# Patient Record
Sex: MALE | Race: BLACK OR AFRICAN AMERICAN | Employment: FULL TIME | URBAN - METROPOLITAN AREA
[De-identification: names, ages, dates, MRNs, and addresses within clinical notes are randomized per-mention and may not be internally consistent; named-entity substitution may affect disease eponyms.]

---

## 2018-12-15 ENCOUNTER — HOSPITAL ENCOUNTER (EMERGENCY)
Age: 33
Discharge: HOME | End: 2018-12-15
Payer: COMMERCIAL

## 2018-12-15 VITALS
HEIGHT: 63 IN | DIASTOLIC BLOOD PRESSURE: 74 MMHG | SYSTOLIC BLOOD PRESSURE: 115 MMHG | HEART RATE: 100 BPM | OXYGEN SATURATION: 96 % | TEMPERATURE: 100.3 F | WEIGHT: 218.48 LBS | RESPIRATION RATE: 14 BRPM | BODY MASS INDEX: 38.71 KG/M2

## 2018-12-15 DIAGNOSIS — J02.9 ACUTE PHARYNGITIS, UNSPECIFIED ETIOLOGY: ICD-10-CM

## 2018-12-15 DIAGNOSIS — H66.92 LEFT ACUTE OTITIS MEDIA: Primary | ICD-10-CM

## 2018-12-15 LAB — S PYO AG THROAT QL: NEGATIVE

## 2018-12-15 PROCEDURE — 6370000000 HC RX 637 (ALT 250 FOR IP): Performed by: NURSE PRACTITIONER

## 2018-12-15 PROCEDURE — 99283 EMERGENCY DEPT VISIT LOW MDM: CPT

## 2018-12-15 PROCEDURE — 87081 CULTURE SCREEN ONLY: CPT

## 2018-12-15 PROCEDURE — 87880 STREP A ASSAY W/OPTIC: CPT

## 2018-12-15 RX ORDER — AMOXICILLIN AND CLAVULANATE POTASSIUM 875; 125 MG/1; MG/1
1 TABLET, FILM COATED ORAL 2 TIMES DAILY
Qty: 20 TABLET | Refills: 0 | Status: SHIPPED | OUTPATIENT
Start: 2018-12-15 | End: 2018-12-25

## 2018-12-15 RX ORDER — AMOXICILLIN AND CLAVULANATE POTASSIUM 875; 125 MG/1; MG/1
1 TABLET, FILM COATED ORAL ONCE
Status: COMPLETED | OUTPATIENT
Start: 2018-12-15 | End: 2018-12-15

## 2018-12-15 RX ADMIN — AMOXICILLIN AND CLAVULANATE POTASSIUM 1 TABLET: 875; 125 TABLET, FILM COATED ORAL at 20:25

## 2018-12-15 ASSESSMENT — ENCOUNTER SYMPTOMS
SHORTNESS OF BREATH: 0
SORE THROAT: 1
COUGH: 0
FACIAL SWELLING: 0
VOMITING: 0
ABDOMINAL PAIN: 0
NAUSEA: 0

## 2018-12-16 NOTE — ED PROVIDER NOTES
file.      SURGICAL HISTORY   No past surgical history on file. CURRENTMEDICATIONS       Previous Medications    No medications on file         ALLERGIES     Patient has no allergy information on record. FAMILYHISTORY     No family history on file. SOCIAL HISTORY       Social History     Social History    Marital status: Single     Spouse name: N/A    Number of children: N/A    Years of education: N/A     Social History Main Topics    Smoking status: Not on file    Smokeless tobacco: Not on file    Alcohol use Not on file    Drug use: Unknown    Sexual activity: Not on file     Other Topics Concern    Not on file     Social History Narrative    No narrative on file       SCREENINGS             PHYSICAL EXAM    (up to 7 for level 4, 8 or more for level 5)     ED Triage Vitals [12/15/18 2008]   BP Temp Temp Source Pulse Resp SpO2 Height Weight   115/74 100.3 °F (37.9 °C) Oral 100 14 96 % 5' 3\" (1.6 m) 218 lb 7.6 oz (99.1 kg)       Physical Exam   Constitutional: He is oriented to person, place, and time. He appears well-developed and well-nourished. HENT:   Head: Normocephalic and atraumatic. Nose: Nose normal.   bilat ear with cerumen, TM dull bilaterally without external ear pain, no pre-or post-auricular tenderness, post orophaynx erythema with tonsilar edema, no exudate   Eyes: Conjunctivae are normal.   Neck: Normal range of motion. Neck supple. Cardiovascular: Normal rate. Pulmonary/Chest: Effort normal and breath sounds normal. No respiratory distress. He has no wheezes. Abdominal: Soft. There is no tenderness. Musculoskeletal: Normal range of motion. Lymphadenopathy:     He has no cervical adenopathy. Neurological: He is alert and oriented to person, place, and time. Skin: Skin is warm and dry. Capillary refill takes less than 2 seconds. No rash noted. No erythema. Psychiatric: He has a normal mood and affect.  His behavior is normal.   Nursing note and vitals

## 2018-12-17 LAB
ORGANISM: ABNORMAL
S PYO THROAT QL CULT: ABNORMAL
S PYO THROAT QL CULT: ABNORMAL

## 2021-09-22 ENCOUNTER — HOSPITAL ENCOUNTER (EMERGENCY)
Age: 36
Discharge: HOME | End: 2021-09-22
Payer: OTHER GOVERNMENT

## 2021-09-22 ENCOUNTER — APPOINTMENT (OUTPATIENT)
Dept: GENERAL RADIOLOGY | Age: 36
End: 2021-09-22
Payer: OTHER GOVERNMENT

## 2021-09-22 VITALS
DIASTOLIC BLOOD PRESSURE: 80 MMHG | RESPIRATION RATE: 18 BRPM | OXYGEN SATURATION: 95 % | WEIGHT: 206.79 LBS | BODY MASS INDEX: 36.64 KG/M2 | HEART RATE: 95 BPM | HEIGHT: 63 IN | SYSTOLIC BLOOD PRESSURE: 126 MMHG | TEMPERATURE: 96.9 F

## 2021-09-22 DIAGNOSIS — Z20.822 SUSPECTED COVID-19 VIRUS INFECTION: Primary | ICD-10-CM

## 2021-09-22 PROCEDURE — U0003 INFECTIOUS AGENT DETECTION BY NUCLEIC ACID (DNA OR RNA); SEVERE ACUTE RESPIRATORY SYNDROME CORONAVIRUS 2 (SARS-COV-2) (CORONAVIRUS DISEASE [COVID-19]), AMPLIFIED PROBE TECHNIQUE, MAKING USE OF HIGH THROUGHPUT TECHNOLOGIES AS DESCRIBED BY CMS-2020-01-R: HCPCS

## 2021-09-22 PROCEDURE — U0005 INFEC AGEN DETEC AMPLI PROBE: HCPCS

## 2021-09-22 PROCEDURE — 71045 X-RAY EXAM CHEST 1 VIEW: CPT

## 2021-09-22 PROCEDURE — 99283 EMERGENCY DEPT VISIT LOW MDM: CPT

## 2021-09-22 RX ORDER — BENZONATATE 100 MG/1
100 CAPSULE ORAL 3 TIMES DAILY PRN
Qty: 30 CAPSULE | Refills: 0 | Status: SHIPPED | OUTPATIENT
Start: 2021-09-22 | End: 2021-09-29

## 2021-09-22 ASSESSMENT — ENCOUNTER SYMPTOMS
SHORTNESS OF BREATH: 0
RHINORRHEA: 0
NAUSEA: 0
VOMITING: 0
ABDOMINAL PAIN: 0
EYES NEGATIVE: 1
COUGH: 1

## 2021-09-22 NOTE — ED TRIAGE NOTES
Pt arrives for eval of headache, cough and body aches onset Monday. PT sts mother and son have Covid. Pt is a/xo4, resp nonlabored and pwd.

## 2021-09-22 NOTE — ED PROVIDER NOTES
[address] [facility]  [address] [address]  Dept: [address] [facility] [address]: [phone]  eMERGENCYdEPARTMENT eNCOUnter      Pt Name: Wendi Barnett  MRN: [id number]  [place] 1985  Date of evaluation: 9/22/2021  Provider:Cecy Sterling PA-C    CHIEF COMPLAINT       Chief Complaint   Patient presents with    Cough     onset 9/20/2021. cough, body aches, fatigue. poor appetite       CRITICAL CARE TIME   Total Critical Care time was 0 minutes, excluding separately reportable procedures. There was a high probability of clinically significant/life threatening deterioration in the patient's condition which required my urgentintervention. HISTORY OF PRESENT ILLNESS  (Location/Symptom, Timing/Onset, Context/Setting, Quality, Duration,Modifying Factors, Severity.)   Wendi Barnett is a 29 y.o. male who presents to the emergency department by private vehicle with concerns for COVID-19. He has had a nonproductive cough, body aches, fatigue and poor appetite since Monday. Today he noticed loss of sense of taste and smell. He has chest pain with coughing only. Denies chest pain in absence of cough, no shortness of breath. No other medical problems. Fiance sick with similar symptoms. Has not received COVID-19 vaccine. Nursing Notes were reviewedand agreed with or any disagreements were addressed in the HPI. REVIEW OF SYSTEMS    (2-9 systems for level 4, 10 or more for level 5)     Review of Systems   Constitutional: Positive for fatigue. HENT: Negative for congestion and rhinorrhea. Loss of sense of taste and smell   Eyes: Negative. Respiratory: Positive for cough. Negative for shortness of breath. Cardiovascular: Positive for chest pain (with couging only). Gastrointestinal: Negative for abdominal pain, nausea and vomiting. Genitourinary: Negative. Musculoskeletal: Positive for arthralgias and myalgias. Skin: Negative. Neurological: Negative for headaches. Psychiatric/Behavioral: Negative for behavioral problems and confusion. Except as noted above the remainder of the review of systems was reviewed and negative. PAST MEDICAL HISTORY   No past medical history on file. SURGICAL HISTORY     No past surgical history on file. CURRENT MEDICATIONS     [unfilled]    ALLERGIES     Patient has no known allergies. FAMILY HISTORY     No family history on file. No family status information on file. SOCIAL HISTORY      reports that he has never smoked. He has never used smokeless tobacco. He reports previous alcohol use. He reports previous drug use. PHYSICAL EXAM    (up to 7 for level 4, 8 or more for level 5)     ED Triage Vitals   Enc Vitals Group      BP 09/22/21 1531 126/80      Pulse 09/22/21 1531 95      Resp 09/22/21 1531 18      Temp 09/22/21 1531 96.9 °F (36.1 °C)      Temp Source 09/22/21 1531 Tympanic      SpO2 09/22/21 1531 95 %      Weight 09/22/21 1515 206 lb 12.7 oz (93.8 kg)      Height 09/22/21 1515 5' 3\" (1.6 m)      Head Circumference --       Peak Flow --       Pain Score --       Pain Loc --       Pain Edu? --       Excl. in [address]? --         Physical Exam  Vitals reviewed. Constitutional:       Appearance: Normal appearance. HENT:      Head: Normocephalic and atraumatic. Cardiovascular:      Rate and Rhythm: Normal rate and regular rhythm. Pulmonary:      Effort: Pulmonary effort is normal. No respiratory distress. Breath sounds: Normal breath sounds. No stridor. No wheezing, rhonchi or rales. Musculoskeletal:         General: Normal range of motion. Cervical back: Normal range of motion and neck supple. Skin:     General: Skin is warm. Neurological:      General: No focal deficit present. Mental Status: He is alert and oriented to person, place, and time.    Psychiatric:         Mood and Affect: Mood normal.         Behavior: Behavior normal.           DIAGNOSTIC RESULTS     EKG: All EKG's are interpreted by the Emergency Department Physician who either signs or Co-signs this chart in the absence of a cardiologist.    RADIOLOGY:   Non-plain film images such as CT, Ultrasound and MRI are read by the radiologist. Plain radiographic images are visualized and preliminarilyinterpreted by the emergency physician with the below findings:    Interpretation per the Radiologist below,if available at the time of this note:    XR CHEST PORTABLE   Final Result   No acute cardiopulmonary abnormality. LABS:  Labs Reviewed   COVID-19       All other labs were within normal range or not returned as of this dictation. EMERGENCY DEPARTMENT COURSE and DIFFERENTIAL DIAGNOSIS/MDM:   Vitals:    Vitals:    09/22/21 1515 09/22/21 1531   BP:  126/80   Pulse:  95   Resp:  18   Temp:  96.9 °F (36.1 °C)   TempSrc:  Tympanic   SpO2:  95%   Weight: 206 lb 12.7 oz (93.8 kg)    Height: 5' 3\" (1.6 m)        MDM     Patient presents to the ED with the HPI noted above. Vital signs reviewed and normal.     Suspected COVID-19 or other viral illness. CXR normal. COVID-PCR obtained and pending. Patient will quarantine pending test results. He has chest pain with coughing only, no other chest pain, no shortness of breath. No further emergent workup and evaluation in ED indicated at this time. He was educated return precautions. He was given medications for symptomatic treatment. PCP referral information for care establishment provided at time of discharge. Patient discharged home in stable condition. The patient tolerated their visit well. I saw the patient independently with physician available for consultation as needed. I have discussed the findings of today's workup with the patient and addressed the patient's questions and concerns. Important warning signs as well as new or worsening symptoms which would necessitate immediate return to the ED were discussed.

## 2021-09-22 NOTE — LETTER
[facility] Emergency Department  [address] [facility] [address]  Phone: [phone]               September 22, 2021    Patient: Twila Rooney   YOB: 1985   Date of Visit: 9/22/2021       To Whom It May Concern:    Kvng Smith was seen and treated in our emergency department on 9/22/2021. He was tested for COVID, please allow him to quarantine for 2-3 days pending this test result. If positive he will need to quarantine for 10 days from symptom onset.       Sincerely,       Wesley Hendrickson PA-C         Signature:__________________________________

## 2021-09-23 ENCOUNTER — CARE COORDINATION (OUTPATIENT)
Dept: CARE COORDINATION | Age: 36
End: 2021-09-23

## 2021-09-23 LAB — SARS-COV-2: DETECTED

## 2021-09-24 ENCOUNTER — CARE COORDINATION (OUTPATIENT)
Dept: CARE COORDINATION | Age: 36
End: 2021-09-24

## 2021-09-24 NOTE — CARE COORDINATION
Patient contacted regarding COVID-19 diagnosis. Discussed COVID-19 related testing which was available at this time. Test results were positive. Patient informed of results, if available? Yes. Ambulatory Care Manager contacted the patient by telephone to perform post discharge assessment. Call within 2 business days of discharge: Yes. Verified name and  with patient as identifiers. Provided introduction to self, and explanation of the CTN/ACM role, and reason for call due to risk factors for infection and/or exposure to COVID-19. Symptoms reviewed with patient who verbalized the following symptoms: fatigue, pain or aching joints, cough, vomiting and diarrhea. Due to no new or worsening symptoms encounter was not routed to provider for escalation. Discussed follow-up appointments. If no appointment was previously scheduled, appointment scheduling offered: Yes. [facility] follow up appointment(s): No future appointments. Non-[facility] follow up appointment(s):     He will call pcp a [facility] , he was not sure of his drs name,encouraged to call to let them know of covid dx   Pt reports diarrhea/ vomiting have decreased, keeping food and water down    Non-face-to-face services provided:  Obtained and reviewed discharge summary and/or continuity of care documents  Reviewed and followed up on pending diagnostic tests and treatments-covid  Education of patient/family/caregiver/guardian to support self-management-cdc guidelines, my chart, sx mgt, quarantinig, return precautions, pcp follow up     Advance Care Planning:   Does patient have an Advance Directive:  not on file. Educated patient about risk for severe COVID-19 due to risk factors according to CDC guidelines. ACM reviewed discharge instructions, medical action plan and red flag symptoms with the patient who verbalized understanding. Discussed COVID vaccination status: Yes. Education provided on COVID-19 vaccination as appropriate.  Discussed exposure protocols and quarantine with CDC Guidelines. Patient was given an opportunity to verbalize any questions and concerns and agrees to contact ACM or health care provider for questions related to their healthcare. Reviewed and educated patient on any new and changed medications related to discharge diagnosis     Was patient discharged with a pulse oximeter? No Discussed and confirmed pulse oximeter discharge instructions and when to notify provider or seek emergency care. ACM provided contact information. Plan for follow-up call in 5-7 days based on severity of symptoms and risk factors.

## 2021-09-30 ENCOUNTER — CARE COORDINATION (OUTPATIENT)
Dept: CARE COORDINATION | Age: 36
End: 2021-09-30

## 2021-09-30 NOTE — CARE COORDINATION
You Patient resolved from the Care Transitions episode on 9/30/21  Discussed COVID-19 related testing which was available at this time. Test results were positive. Patient informed of results, if available? Yes    Patient/family has been provided the following resources and education related to COVID-19:                         Signs, symptoms and red flags related to COVID-19            CDC exposure and quarantine guidelines            [facility] exposure contact - [phone]            Contact for their local Department of Health                 Patient currently reports that the following symptoms have improved:  diarrhea, vomiting,      No further outreach scheduled with this CTN/ACM. Episode of Care resolved. Patient has this CTN/ACM contact information if future needs arise.

## 2022-08-06 ENCOUNTER — HOSPITAL ENCOUNTER (EMERGENCY)
Age: 37
Discharge: HOME | End: 2022-08-06

## 2022-08-06 VITALS
OXYGEN SATURATION: 97 % | SYSTOLIC BLOOD PRESSURE: 138 MMHG | WEIGHT: 218.92 LBS | DIASTOLIC BLOOD PRESSURE: 85 MMHG | TEMPERATURE: 99.8 F | BODY MASS INDEX: 37.37 KG/M2 | HEIGHT: 64 IN | RESPIRATION RATE: 18 BRPM | HEART RATE: 98 BPM

## 2022-08-06 DIAGNOSIS — L02.811 ABSCESS, SCALP: Primary | ICD-10-CM

## 2022-08-06 DIAGNOSIS — L73.9 FOLLICULITIS: ICD-10-CM

## 2022-08-06 PROCEDURE — 99283 EMERGENCY DEPT VISIT LOW MDM: CPT

## 2022-08-06 PROCEDURE — 6370000000 HC RX 637 (ALT 250 FOR IP): Performed by: PHYSICIAN ASSISTANT

## 2022-08-06 PROCEDURE — 10060 I&D ABSCESS SIMPLE/SINGLE: CPT

## 2022-08-06 RX ORDER — CEPHALEXIN 500 MG/1
500 CAPSULE ORAL 4 TIMES DAILY
Qty: 40 CAPSULE | Refills: 0 | Status: SHIPPED | OUTPATIENT
Start: 2022-08-06 | End: 2022-08-16

## 2022-08-06 RX ORDER — IBUPROFEN 400 MG/1
800 TABLET ORAL ONCE
Status: COMPLETED | OUTPATIENT
Start: 2022-08-06 | End: 2022-08-06

## 2022-08-06 RX ORDER — SULFAMETHOXAZOLE AND TRIMETHOPRIM 800; 160 MG/1; MG/1
1 TABLET ORAL 2 TIMES DAILY
Qty: 20 TABLET | Refills: 0 | Status: SHIPPED | OUTPATIENT
Start: 2022-08-06 | End: 2022-08-16

## 2022-08-06 RX ORDER — IBUPROFEN 800 MG/1
800 TABLET ORAL EVERY 8 HOURS PRN
Qty: 18 TABLET | Refills: 0 | Status: SHIPPED | OUTPATIENT
Start: 2022-08-06

## 2022-08-06 RX ADMIN — IBUPROFEN 800 MG: 400 TABLET, FILM COATED ORAL at 21:42

## 2022-08-06 ASSESSMENT — PAIN SCALES - GENERAL: PAINLEVEL_OUTOF10: 9

## 2022-08-06 ASSESSMENT — PAIN - FUNCTIONAL ASSESSMENT: PAIN_FUNCTIONAL_ASSESSMENT: 0-10

## 2022-08-07 NOTE — ED TRIAGE NOTES
.Marion Rivera is a 40 y.o. male brought himself to the ER for eval of rash and swelling on the back of his head that started after he got a haircut Monday. The patient believes its from dirty clippers. The patient is alert and oriented with an open and patent airway.

## 2022-08-07 NOTE — DISCHARGE INSTRUCTIONS
Home in stable condition change bandage at least a couple times daily, do warm compresses, and use medications as written. Monitor for gradual improvement. Follow-up with your family doctor in 2 to 3 days for recheck and further care if needed. Return to the emergency department for any emergency worsening or concern. no known allergies

## 2022-08-07 NOTE — ED NOTES
.Pt discharged at this time. Discharge instructions and medications reviewed,  Questions were answered. PT verbalized understanding. Follow up appointments were discussed.          [facility]  08/06/22 4770

## 2022-08-07 NOTE — ED PROVIDER NOTES
**ADVANCED PRACTICE PROVIDER, I HAVE EVALUATED THIS PATIENT**        [address]      Pt Name: Marielena Petersen  [facility]:[id number]  [place] 1985  Date of evaluation: 8/6/2022  Provider: Durga Araiza PA-C      Chief Complaint:    Chief Complaint   Patient presents with    Rash     Patient got a haircut Monday and now has a rash and swelling on the back of his head, worried its from dirty clippers         Nursing Notes, Past Medical Hx, Past Surgical Hx, Social Hx, Allergies, and Family Hx were all reviewed and agreed with or any disagreements were addressed in the HPI.    HPI: (Location, Duration, Timing, Severity, Quality, Assoc Sx, Context, Modifying factors)    Chief Complaint of rash and now swelling and pain to the posterior scalp after having his hair clippered recently    This is a  40 y.o. male who presents stating that this started off as an itchy bumpy rash in the posterior scalp area after having his hair done recently. However now there is a swelling with local constant 9 out of 10 pain worse with pushing on the area and better at rest.  No active oozing or seeping. He states that he does have a history of previous abscess that was similar to this on his neck that needed to be drained. That was not in the last few months. No fevers cough or congestion. He is eating and drinking well. No generalized headache or neck pain or stiffness or feeling of general malaise or illness. No nausea or vomiting. PastMedical/Surgical History:      Diagnosis Date    Diabetes mellitus ([facility] [address]      History reviewed. No pertinent surgical history. Medications:  Previous Medications    METFORMIN (GLUCOPHAGE) 1000 MG TABLET    Take 1,000 mg by mouth in the morning and 1,000 mg in the evening. Take with meals.          Review of Systems:  (2-9 systems needed)  Review of Systems  Positive history as above with no acute fall contusion or twisting injury. No generalized headache but positive for local constant pain and tenderness worse with pushing on it and better at rest in the posterior right scalp with swelling and also itchy rash. No neck pain or stiffness shortness of breath or chest pain typically swallowing or taking liquids. No nausea or vomiting or extremity acute weakness or loss range of motion or strength. \"Positives and Pertinent negatives as per HPI\"    Physical Exam:  Physical Exam  Vitals and nursing note reviewed. Constitutional:       Appearance: Normal appearance. He is not diaphoretic. HENT:      Head: Normocephalic and atraumatic. Comments: Folliculitis noted on the posterior scalp with also an abscess 4 cm across in the posterior right lower scalp with induration and fluctuance no active oozing or seeping. Right Ear: External ear normal.      Left Ear: External ear normal.      Nose: Nose normal.      Mouth/Throat:      Mouth: Mucous membranes are moist.      Comments: Gag reflex intact  Eyes:      General:         Right eye: No discharge. Left eye: No discharge. Conjunctiva/sclera: Conjunctivae normal.   Pulmonary:      Effort: Pulmonary effort is normal. No respiratory distress. Musculoskeletal:      Cervical back: Normal range of motion and neck supple. No rigidity or tenderness. Lymphadenopathy:      Cervical: No cervical adenopathy. Skin:     General: Skin is warm and dry. Capillary Refill: Capillary refill takes less than 2 seconds. Findings: Rash present. Neurological:      Mental Status: He is alert and oriented to person, place, and time. Mental status is at baseline. Sensory: No sensory deficit. Motor: No weakness.       Coordination: Coordination normal.      Gait: Gait normal.   Psychiatric:         Mood and Affect: Mood normal.         Behavior: Behavior normal.       MEDICAL DECISION MAKING    Vitals:    Vitals:    08/06/22 2037   BP: 138/85   Pulse: 98   Resp: 18 Temp: 99.8 °F (37.7 °C)   TempSrc: Oral   SpO2: 97%   Weight: 218 lb 14.7 oz (99.3 kg)   Height: 5' 4\" (1.626 m)       LABS:Labs Reviewed - No data to display       RADIOLOGY:   Non-plain film images such as CT, Ultrasound and MRI are read by the radiologist. Kenyatta Hoskins PA-C have directly visualized the radiologic plain film image(s) with the below findings:      Interpretation per the Radiologist below, if available at the time of this note:    No orders to display        No results found. MEDICAL DECISION MAKING / ED COURSE:      PROCEDURES:   Incision/Drainage    Date/Time: 8/6/2022 9:36 PM  Performed by: Ai Millan PA-C  Authorized by: Ai Millan PA-C     Consent:     Consent obtained:  Verbal    Consent given by:  Patient  Location:     Type:  Abscess    Size:  4 cm    Location:  Head    Head location:  Scalp  Pre-procedure details:     Procedure prep: alchol. Sedation:     Sedation type:  None  Anesthesia:     Anesthesia method:  Local infiltration    Local anesthetic:  Lidocaine 1% WITH epi and bupivacaine 0.5% w/o epi  Procedure type:     Complexity:  Simple  Procedure details:     Incision types:  Cruciate    Incision depth:  Subcutaneous    Wound management:  Probed and deloculated    Drainage:  Serosanguinous    Drainage amount: Moderate    Wound treatment: Clean 4 x 4 taped over the area as bandage. Packing materials:  None  Post-procedure details:     Procedure completion:  Tolerated well, no immediate complications    None    Patient was given:  Medications   ibuprofen (ADVIL;MOTRIN) tablet 800 mg (has no administration in time range)     This patient presents as above and indicates that his last tetanus shot is within the last 1 year or so. He would like incision and drainage for the abscess noted above. Successful incision and drainage performed as above with now decreased induration of the area. Medication for comfort ordered here in the emergency department.   No indication of widespread infection or other acute compromise currently. Conservative home care also discussed and recommended to patient who verbalizes understanding and agreement with the above and the following discharge home plan. Home in stable condition change bandage at least a couple times daily, do warm compresses, and use medications as written. Monitor for gradual improvement. Follow-up with your family doctor in 2 to 3 days for recheck and further care if needed. Return to the emergency department for any emergency worsening or concern. The patient tolerated their visit well. I evaluated the patient. The physician was available for consultation as needed. The patient and / or the family were informed of the results of any tests, a time was given to answer questions, a plan was proposed and they agreed with plan. CLINICAL IMPRESSION:  1. Abscess, scalp    2. Folliculitis        DISPOSITION Decision To Discharge 08/06/2022 09:31:05 PM      PATIENT REFERRED TO:  Cannon Olszewski, APRN - CNP  [address] [place] [address]  [phone]    Schedule an appointment as soon as possible for a visit   As needed    DISCHARGE MEDICATIONS:  New Prescriptions    CEPHALEXIN (KEFLEX) 500 MG CAPSULE    Take 1 capsule by mouth in the morning and 1 capsule at noon and 1 capsule in the evening and 1 capsule before bedtime. Do all this for 10 days. IBUPROFEN (IBU) 800 MG TABLET    Take 1 tablet by mouth every 8 hours as needed for Pain (with food)    SULFAMETHOXAZOLE-TRIMETHOPRIM (BACTRIM DS) 800-160 MG PER TABLET    Take 1 tablet by mouth in the morning and 1 tablet before bedtime. Do all this for 10 days.        DISCONTINUED MEDICATIONS:  Discontinued Medications    No medications on file              (Please note the MDM and HPI sections of this note were completed with a voice recognition program.  Efforts were made to edit the dictations but occasionally words are mis-transcribed.)    Electronically signed, Hazel Noble PA-C,           Hazel Noble PA-C  08/06/22 8332

## 2022-08-08 ENCOUNTER — HOSPITAL ENCOUNTER (EMERGENCY)
Age: 37
Discharge: HOME | End: 2022-08-09

## 2022-08-08 VITALS
DIASTOLIC BLOOD PRESSURE: 89 MMHG | BODY MASS INDEX: 36.5 KG/M2 | HEART RATE: 96 BPM | OXYGEN SATURATION: 96 % | HEIGHT: 64 IN | TEMPERATURE: 99.2 F | WEIGHT: 213.8 LBS | SYSTOLIC BLOOD PRESSURE: 149 MMHG | RESPIRATION RATE: 18 BRPM

## 2022-08-08 DIAGNOSIS — L02.91 ABSCESS: Primary | ICD-10-CM

## 2022-08-08 PROCEDURE — 6370000000 HC RX 637 (ALT 250 FOR IP): Performed by: PHYSICIAN ASSISTANT

## 2022-08-08 PROCEDURE — 10060 I&D ABSCESS SIMPLE/SINGLE: CPT

## 2022-08-08 PROCEDURE — 99283 EMERGENCY DEPT VISIT LOW MDM: CPT

## 2022-08-08 RX ORDER — HYDROCODONE BITARTRATE AND ACETAMINOPHEN 5; 325 MG/1; MG/1
1 TABLET ORAL ONCE
Status: COMPLETED | OUTPATIENT
Start: 2022-08-08 | End: 2022-08-08

## 2022-08-08 RX ADMIN — HYDROCODONE BITARTRATE AND ACETAMINOPHEN 1 TABLET: 5; 325 TABLET ORAL at 23:35

## 2022-08-08 ASSESSMENT — PAIN DESCRIPTION - LOCATION: LOCATION: HEAD

## 2022-08-08 ASSESSMENT — PAIN SCALES - GENERAL
PAINLEVEL_OUTOF10: 10
PAINLEVEL_OUTOF10: 10

## 2022-08-08 ASSESSMENT — LIFESTYLE VARIABLES
HOW MANY STANDARD DRINKS CONTAINING ALCOHOL DO YOU HAVE ON A TYPICAL DAY: PATIENT DOES NOT DRINK
HOW OFTEN DO YOU HAVE A DRINK CONTAINING ALCOHOL: NEVER

## 2022-08-08 ASSESSMENT — PAIN - FUNCTIONAL ASSESSMENT: PAIN_FUNCTIONAL_ASSESSMENT: 0-10

## 2022-08-09 NOTE — DISCHARGE INSTRUCTIONS
MRSA OR ABSCESS Wound instructions    ____ Packing:  Remove packing in 24-48 hrs. _x___ Warm Epsom Salt Water Compresses or Soaks 5-10 min Every Hour Today then 3-7 times daily for 5-7 days. Use 1/4 cup Epsom Salt to 16 Oz of hot water. Add a tablespoon of bleach or antibacterial soap to the water. __x__ Hot Showers 2-3 times daily. _x___ Bactrim DS as directed (usually 1-2 tablets 2 times a day). _x___ Keflex (Cephalexin) as directed (usually 1 tablet 4 times a day). ____ Clindamycin 300mg as directed (usually 1 tablet 4 times a day)    ____ Doxycycline 100mg as directed (usually 1 tablet 2 times daily). ____ Bactroban (Mupirocin) ointment as directed. ____ Eunice Keenan ribbon to each nostril 2-3 times a day and massage for one minute.   ____ Apply to the affected area 2 times a day and cover with a dressing. ____ Hibiclens (chlorohexidine) wash daily, head to toe, for one week then weekly. This is available over the counter at the pharmacy. Keep wound covered and moist until healing has occurred. This may take several weeks. RETURN TO THE EMERGENCY DEPARTMENT IF THE AREA GETS REDDER, MORE SWOLLEN OR MORE PAINFUL. THIS IS NOT A SPIDER BITE! How can I prevent staph or MRSA skin infections? Practice good hygiene:  Keep your hands clean by washing thoroughly with soap and water or using an alcohol-based hand . Keep cuts and scrapes clean and covered with a bandage until healed. Avoid contact with other peoples wounds or bandages. Avoid sharing personal items such as towels or razors. Information from this web site:  [employer]nz  Abscess/Boil  (Furuncle)     An abscess (boil or furuncle) is an infected area that contains a collection of pus. SYMPTOMS  Signs and symptoms of an abscess include pain, tenderness, redness, or hardness. You may feel a moveable soft area under your skin. An abscess can occur anywhere in the body. TREATMENT  An incision (cut by the caregiver) may have been made over your abscess so the pus could be drained out. Gauze may have been packed into the space or a drain may have been looped thru the abscess cavity (pocket). This provides a drain that will allow the cavity to heal from the inside outwards. The abscess may be painful for a few days, but should feel much better if it was drained. Your abscess, if seen early, may not have localized and may not have been drained. If not, another appointment may be required if it does not get better on its own or with medications. See your caregiver as directed for a recheck or sooner if you develop any of the symptoms described above. Take antibiotics (medicine that kills germs) as directed if they were prescribed. MAKE SURE YOU:   Ø Understand these instructions. Ø Will watch your condition. Ø Will get help right away if you are not doing well or get worse. Document Released: 09/27/2006  Document Re-Released: 10/15/2010  [employer]® Patient Information ©2011 [employer].

## 2022-08-09 NOTE — ED PROVIDER NOTES
[facility]        Pt Name: Judd Armstrong  MRN: [id number]  [place] 1985  Date of evaluation: 8/8/2022  Provider: Andriy Morelos PA-C  PCP: No primary care provider on file. Note Started: 10:55 PM EDT       CAROLINA. I have evaluated this patient. My supervising physician was available for consultation. CHIEF COMPLAINT       Chief Complaint   Patient presents with    Abscess     Pt arrives ambulatory w c/o abscess on the back of his head. Pt states he was seen at [address] [facility] [address] on Saturday & they drained it. Pain has increased since then. +redness to site. VSS. HISTORY OF PRESENT ILLNESS   (Location, Timing/Onset, Context/Setting, Quality, Duration, Modifying Factors, Severity, Associated Signs and Symptoms)  Note limiting factors. Chief Complaint: Payton Armstrong is a 40 y.o. male who presents to the emergency department with a chief complaint of abscess. Patient states about 4-5 days ago began getting some irritation in the back of his scalp. Began noticing some swelling 3 days ago and 2 days ago was seen at [facility] had an incision and drainage performed that revealed some moderate purulent drainage that was expressed. He has been on Bactrim and Keflex. He has diabetes. States it feels like the pain is getting worse and going into his neck. Rates the pain a 10 out of 10. Had some nausea yesterday. Denies fevers or history of MRSA or other skin infections. Nursing Notes were all reviewed and agreed with or any disagreements were addressed in the HPI. REVIEW OF SYSTEMS    (2-9 systems for level 4, 10 or more for level 5)     Review of Systems    Positives and Pertinent negatives as per HPI. Except as noted above in the ROS, all other systems were reviewed and negative.        PAST MEDICAL HISTORY     Past Medical History:   Diagnosis Date    Diabetes mellitus ([facility] [address]          SURGICAL HISTORY Nose: Nose normal.   Eyes:      General:         Right eye: No discharge. Left eye: No discharge. Cardiovascular:      Rate and Rhythm: Normal rate and regular rhythm. Heart sounds: No murmur heard. No friction rub. No gallop. Pulmonary:      Effort: Pulmonary effort is normal. No respiratory distress. Breath sounds: No stridor. No wheezing, rhonchi or rales. Musculoskeletal:         General: No swelling. Normal range of motion. Cervical back: Normal range of motion. Skin:     General: Skin is warm and dry. Findings: No erythema or rash. Neurological:      Mental Status: He is alert and oriented to person, place, and time. Cranial Nerves: No cranial nerve deficit. Psychiatric:         Behavior: Behavior normal.       DIAGNOSTIC RESULTS   LABS:    Labs Reviewed - No data to display    When ordered only abnormal lab results are displayed. All other labs were within normal range or not returned as of this dictation. EKG: When ordered, EKG's are interpreted by the Emergency Department Physician in the absence of a cardiologist.  Please see their note for interpretation of EKG. RADIOLOGY:   Non-plain film images such as CT, Ultrasound and MRI are read by the radiologist. Plain radiographic images are visualized and preliminarily interpreted by the ED Provider with the below findings:        Interpretation per the Radiologist below, if available at the time of this note:    No orders to display     No results found.         PROCEDURES   Unless otherwise noted below, none     Incision/Drainage    Date/Time: 8/8/2022 11:44 PM  Performed by: Rip Jeronimo PA-C  Authorized by: Rip Jeronimo PA-C     Consent:     Consent obtained:  Verbal    Consent given by:  Patient    Risks discussed:  Bleeding, incomplete drainage, pain, damage to other organs and infection  Universal protocol:     Patient identity confirmed:  Verbally with patient  Location:     Type: did understand the strict return precautions and was stable time of discharge and will return here for any worsening of symptoms or problems at home. FINAL IMPRESSION      1.  Abscess          DISPOSITION/PLAN   DISPOSITION Decision To Discharge 08/08/2022 11:22:03 PM      PATIENT REFERRED TO:  Your PCP    Schedule an appointment as soon as possible for a visit in 3 days  For re-check    [facility] Emergency Department  [address] [place] [phone]    As needed    DISCHARGE MEDICATIONS:  New Prescriptions    No medications on file       DISCONTINUED MEDICATIONS:  Discontinued Medications    No medications on file              (Please note that portions of this note were completed with a voice recognition program.  Efforts were made to edit the dictations but occasionally words are mis-transcribed.)    Shannan Snyder PA-C (electronically signed)            Shannan Snyder PA-C  08/08/22 1488

## 2023-02-27 ENCOUNTER — HOSPITAL ENCOUNTER (EMERGENCY)
Age: 38
Discharge: HOME | End: 2023-02-28
Attending: EMERGENCY MEDICINE

## 2023-02-27 VITALS
WEIGHT: 206 LBS | OXYGEN SATURATION: 95 % | BODY MASS INDEX: 35.36 KG/M2 | RESPIRATION RATE: 18 BRPM | DIASTOLIC BLOOD PRESSURE: 88 MMHG | SYSTOLIC BLOOD PRESSURE: 132 MMHG | HEART RATE: 122 BPM | TEMPERATURE: 98.9 F

## 2023-02-27 DIAGNOSIS — S61.011A THUMB LACERATION, RIGHT, INITIAL ENCOUNTER: ICD-10-CM
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
## 2023-02-27 PROCEDURE — 12001 RPR S/N/AX/GEN/TRNK 2.5CM/<: CPT

## 2023-02-27 PROCEDURE — 99283 EMERGENCY DEPT VISIT LOW MDM: CPT

## 2023-02-27 RX ORDER — AMOXICILLIN AND CLAVULANATE POTASSIUM 875; 125 MG/1; MG/1
1 TABLET, FILM COATED ORAL 2 TIMES DAILY
Qty: 20 TABLET | Refills: 0 | Status: SHIPPED | OUTPATIENT
Start: 2023-02-27 | End: 2023-03-09

## 2023-02-27 RX ORDER — AMOXICILLIN AND CLAVULANATE POTASSIUM 875; 125 MG/1; MG/1
1 TABLET, FILM COATED ORAL EVERY 12 HOURS SCHEDULED
Status: DISCONTINUED | OUTPATIENT
Start: 2023-02-27 | End: 2023-02-28 | Stop reason: HOSPADM

## 2023-02-27 RX ORDER — HYDROCODONE BITARTRATE AND ACETAMINOPHEN 5; 325 MG/1; MG/1
1 TABLET ORAL EVERY 4 HOURS PRN
Qty: 18 TABLET | Refills: 0 | Status: SHIPPED | OUTPATIENT
Start: 2023-02-27 | End: 2023-03-02

## 2023-02-27 ASSESSMENT — PAIN SCALES - GENERAL: PAINLEVEL_OUTOF10: 8

## 2023-02-27 ASSESSMENT — PAIN - FUNCTIONAL ASSESSMENT: PAIN_FUNCTIONAL_ASSESSMENT: 0-10

## 2023-02-27 NOTE — Clinical Note
Cintia Fatima was seen and treated in our emergency department on 2/27/2023. He may return to work on 03/03/2023. If you have any questions or concerns, please don't hesitate to call.       Meir May MD

## 2023-02-28 PROCEDURE — 6370000000 HC RX 637 (ALT 250 FOR IP): Performed by: EMERGENCY MEDICINE

## 2023-02-28 RX ADMIN — AMOXICILLIN AND CLAVULANATE POTASSIUM 1 TABLET: 875; 125 TABLET, FILM COATED ORAL at 00:01

## 2023-02-28 NOTE — ED PROVIDER NOTES
[facility]  eMERGENCY dEPARTMENT eNCOUnter        Pt Name: Liset Dahl  MRN: [id number]  [place] 1985  Date of evaluation: 2/27/2023  Provider: Casie Shoemaker MD  PCP: HANK Wood - CNP      CHIEF COMPLAINT       Chief Complaint   Patient presents with    Animal Bite     Pt was bitten by his dog on his right thumb, pulled his hand back when the dog bit him. Dog up to date on vaccinations. TDAP up to date       HISTORY OFPRESENT ILLNESS   (Location/Symptom, Timing/Onset, Context/Setting, Quality, Duration, Modifying Factors,Severity)  Note limiting factors. Liset Dahl is a 40 y.o. male who was trying to break up a fight and his dog bit him over the right thumb he pulled it out and sustained a laceration tetanus is up-to-date no other injuries    Nursing Notes were all reviewed and agreed with or any disagreements were addressed  in the HPI. REVIEW OF SYSTEMS    (2-9 systems for level 4, 10 or more for level 5)       REVIEW OF SYSTEMS    Constitutional:  Denies fever, chills, or weakness   Eyes:  Denies vision changes  HENT:  Denies sore throat or neck pain   Respiratory:  Denies cough or shortness of breath   Cardiovascular:  Denies chest pain  GI:  Denies abdominal pain, nausea, vomiting, or diarrhea   Musculoskeletal:  Denies back pain   Skin: no rash or vesicles   Neurologic:  no headache weakness focal    Lymphatic:  no swollen  nodes   Psychiatric: no si or hs thoughts     All systems negative except as marked. Positives and Pertinent negatives as per HPI. Except as noted above in the ROS, all other systems were reviewed andnegative. PASTMEDICAL HISTORY     Past Medical History:   Diagnosis Date    Diabetes mellitus ([facility] [address]          SURGICAL HISTORY     No past surgical history on file.       CURRENT MEDICATIONS       Discharge Medication List as of 2/28/2023 12:02 AM        CONTINUE these medications which have NOT CHANGED Details   metFORMIN (GLUCOPHAGE) 1000 MG tablet Take 1,000 mg by mouth in the morning and 1,000 mg in the evening. Take with meals. Historical Med      ibuprofen (IBU) 800 MG tablet Take 1 tablet by mouth every 8 hours as needed for Pain (with food), Disp-18 tablet, R-0Normal             ALLERGIES     Patient has no known allergies. FAMILY HISTORY     No family history on file. SOCIAL HISTORY       Social History     Socioeconomic History    Marital status: Single   Tobacco Use    Smoking status: Never    Smokeless tobacco: Never   Vaping Use    Vaping Use: Never used   Substance and Sexual Activity    Alcohol use: Not Currently    Drug use: Not Currently       SCREENINGS    Ida Coma Scale  Eye Opening: Spontaneous  Best Verbal Response: Oriented  Best Motor Response: Obeys commands  Ida Coma Scale Score: 15        PHYSICAL EXAM    (up to 7 for level 4, 8 or more for level 5)     ED Triage Vitals [02/27/23 2317]   BP Temp Temp Source Heart Rate Resp SpO2 Height Weight   132/88 98.9 °F (37.2 °C) Oral (!) 122 18 95 % -- 206 lb (93.4 kg)           General Appearance:  Alert, cooperative, no distress, appears stated age. Head:  Normocephalic, without obvious abnormality, atraumatic. Eyes:  conjunctiva/corneas clear, EOM's intact. Sclera anicteric. ENT: Mucous membranes moist.   Neck: Supple, symmetrical, trachea midline, no adenopathy. No jugular venous distention. Lungs:   No Respiratory Distress. no rales  rhonchi rub   Chest Wall:  Nontender  no deformity   Heart:  Rsr no murmer gallop    Abdomen:   Soft nontender no organomegally    Extremities:  Full range of motion. no deformity   Pulses: Equal  upper and lower    Skin:  No rashes or lesions to exposed skin. Neurologic: Alert and oriented X 3. Motor grossly normal.  Speech clear.  Cr n 2-12 intact   3 cm laceration involving the medial aspect of the right thumb extending to under the nail good flexion extension    DIAGNOSTIC RESULTS LABS:    Labs Reviewed - No data to display    All other labs were within normal range or not returned as of thisdictation. EKG: All EKG's are interpreted by the Emergency Department Physician who either signs or Co-signs this chart in the absence of a cardiologist.        RADIOLOGY:   Non-plain film images such as CT, Ultrasound and MRI are read by the radiologist. Celeste Big images are visualized and preliminarily interpreted by the  ED Provider with the belowfindings:        Interpretation per the Radiologist below, if available at the time of this note:    No orders to display         PROCEDURES   Unless otherwise noted below, none     Procedures laceration was cleansed with Shur-Clens blocked with 1/2% Marcaine digital block it was irrigated and then reapproximated with 4-0 Ethilon interrupted x8 with good reapproximation debridement of the skin edges with reasonable closure of the laceration    CRITICAL CARE TIME   N/A      CONSULTS:  None    EMERGENCY DEPARTMENT COURSE and DIFFERENTIAL DIAGNOSIS/MDM:   Vitals:    Vitals:    02/27/23 2317   BP: 132/88   Pulse: (!) 122   Resp: 18   Temp: 98.9 °F (37.2 °C)   TempSrc: Oral   SpO2: 95%   Weight: 206 lb (93.4 kg)       Patient was given the following medications:  Medications - No data to display        Is this patient to be included in the SEP-1 Core Measure due to severe sepsis or septic shock? No   Exclusion criteria - the patient is NOT to be included for SEP-1 Core Measure due to: Infection is not suspected    Stitches out in 12 days the patient is to be on Augmentin and was given Norco for pain  The patient tolerated their visit well. Thepatient and / or the family were informed of the results of any tests, a time was given to answer questions. FINAL IMPRESSION      1. Dog bite, initial encounter    2.  Thumb laceration, right, initial encounter        DISPOSITION/PLAN   DISPOSITION Decision To Discharge 02/27/2023 11:39:34 PM      PATIENT REFERRED TO:  Thu Garcia, APRN - CNP  [address] [place] [address]  [phone]          DISCHARGE MEDICATIONS:  Discharge Medication List as of 2/28/2023 12:02 AM        START taking these medications    Details   amoxicillin-clavulanate (AUGMENTIN) 875-125 MG per tablet Take 1 tablet by mouth 2 times daily for 10 days, Disp-20 tablet, R-0Normal      HYDROcodone-acetaminophen (NORCO) 5-325 MG per tablet Take 1 tablet by mouth every 4 hours as needed for Pain for up to 3 days. Intended supply: 3 days.  Take lowest dose possible to manage pain Max Daily Amount: 6 tablets, Disp-18 tablet, R-0Normal             DISCONTINUED MEDICATIONS:  Discharge Medication List as of 2/28/2023 12:02 AM                 (Please note that portions of this note were completed with a voice recognition program.  Efforts were made to edit the dictations but occasionally words aremis-transcribed.)    Ed Sen MD (electronically signed)           Ed Sen MD  02/28/23 7239

## 2023-11-15 ENCOUNTER — HOSPITAL ENCOUNTER (EMERGENCY)
Age: 38
Discharge: HOME | End: 2023-11-16

## 2023-11-15 DIAGNOSIS — R73.9 HYPERGLYCEMIA: ICD-10-CM

## 2023-11-15 DIAGNOSIS — R51.9 ACUTE NONINTRACTABLE HEADACHE, UNSPECIFIED HEADACHE TYPE: Primary | ICD-10-CM

## 2023-11-15 DIAGNOSIS — Z76.0 ENCOUNTER FOR MEDICATION REFILL: ICD-10-CM

## 2023-11-15 LAB
CHP ED QC CHECK: NORMAL
GLUCOSE BLD-MCNC: 236 MG/DL
GLUCOSE BLD-MCNC: 236 MG/DL (ref 70–99)
PERFORMED ON: ABNORMAL

## 2023-11-15 PROCEDURE — 99283 EMERGENCY DEPT VISIT LOW MDM: CPT

## 2023-11-15 ASSESSMENT — PAIN DESCRIPTION - DESCRIPTORS: DESCRIPTORS: DISCOMFORT

## 2023-11-15 ASSESSMENT — PAIN DESCRIPTION - ONSET: ONSET: AWAKENED FROM SLEEP

## 2023-11-15 ASSESSMENT — PAIN DESCRIPTION - DIRECTION: RADIATING_TOWARDS: RIGHT EYE

## 2023-11-15 ASSESSMENT — PAIN DESCRIPTION - PAIN TYPE: TYPE: ACUTE PAIN

## 2023-11-15 ASSESSMENT — PAIN DESCRIPTION - FREQUENCY: FREQUENCY: CONTINUOUS

## 2023-11-15 ASSESSMENT — PAIN DESCRIPTION - LOCATION: LOCATION: HEAD

## 2023-11-15 ASSESSMENT — PAIN DESCRIPTION - ORIENTATION: ORIENTATION: RIGHT

## 2023-11-15 ASSESSMENT — PAIN - FUNCTIONAL ASSESSMENT: PAIN_FUNCTIONAL_ASSESSMENT: ACTIVITIES ARE NOT PREVENTED

## 2023-11-15 ASSESSMENT — PAIN SCALES - GENERAL: PAINLEVEL_OUTOF10: 8

## 2023-11-16 VITALS
RESPIRATION RATE: 16 BRPM | HEART RATE: 80 BPM | OXYGEN SATURATION: 98 % | WEIGHT: 206 LBS | TEMPERATURE: 97.6 F | DIASTOLIC BLOOD PRESSURE: 92 MMHG | SYSTOLIC BLOOD PRESSURE: 150 MMHG | BODY MASS INDEX: 35.17 KG/M2 | HEIGHT: 64 IN

## 2023-11-16 PROCEDURE — 6370000000 HC RX 637 (ALT 250 FOR IP): Performed by: PHYSICIAN ASSISTANT

## 2023-11-16 RX ORDER — BUTALBITAL, ACETAMINOPHEN AND CAFFEINE 300; 40; 50 MG/1; MG/1; MG/1
1 CAPSULE ORAL EVERY 4 HOURS PRN
Qty: 10 CAPSULE | Refills: 0 | Status: SHIPPED | OUTPATIENT
Start: 2023-11-16

## 2023-11-16 RX ORDER — ONDANSETRON 4 MG/1
4 TABLET, FILM COATED ORAL EVERY 8 HOURS PRN
Qty: 20 TABLET | Refills: 0 | Status: SHIPPED | OUTPATIENT
Start: 2023-11-16

## 2023-11-16 RX ADMIN — IBUPROFEN 600 MG: 200 TABLET, FILM COATED ORAL at 00:23

## 2023-11-16 RX ADMIN — METFORMIN HYDROCHLORIDE 500 MG: 500 TABLET ORAL at 00:27

## 2023-11-16 ASSESSMENT — PAIN SCALES - GENERAL: PAINLEVEL_OUTOF10: 7

## 2023-11-16 ASSESSMENT — PAIN DESCRIPTION - LOCATION: LOCATION: HEAD

## 2023-11-16 NOTE — ED TRIAGE NOTES
Patient to the ER with complaints of headache and right eye pain. Patient says symptoms started when he woke up this morning . Denies any OTC pain meds PTA. Patient also reports hx of diabetes. He has not been taking his metformin 1 year ago and has not had his sugar checked since. He is concerned that his eye problem is related to the diabetes. Denies any other symptoms.

## 2023-11-16 NOTE — DISCHARGE INSTRUCTIONS
Your blood pressure was elevated in the ED. Your glucose was elevated. Start taking metformin. You can start with 500 mg nightly. If this is tolerable you can increase it to 500 mg twice a day in 1 to 2 weeks. Call your primary care doctor tomorrow to arrange for close follow-up and reevaluation. If you get nauseous with metformin you can take Zofran. You can take over-the-counter ibuprofen or Tylenol for headache, or you can try the Fioricet prescribed. Return to the ED if any new or worsening symptoms.

## 2023-11-16 NOTE — ED NOTES
Discharge and education instructions reviewed. Patient verbalized understanding, teach-back successful. Patient denied questions at this time. No acute distress noted. Patient instructed to follow-up as noted - return to emergency department if symptoms worsen. Patient verbalized understanding. Discharged per EDMD with discharge instructions.         Linda El RN  11/16/23 9514